# Patient Record
Sex: FEMALE | Race: BLACK OR AFRICAN AMERICAN
[De-identification: names, ages, dates, MRNs, and addresses within clinical notes are randomized per-mention and may not be internally consistent; named-entity substitution may affect disease eponyms.]

---

## 2017-09-02 ENCOUNTER — HOSPITAL ENCOUNTER (EMERGENCY)
Dept: HOSPITAL 74 - JER | Age: 37
Discharge: HOME | End: 2017-09-02
Payer: SELF-PAY

## 2017-09-02 VITALS — SYSTOLIC BLOOD PRESSURE: 129 MMHG | HEART RATE: 87 BPM | TEMPERATURE: 98.5 F | DIASTOLIC BLOOD PRESSURE: 76 MMHG

## 2017-09-02 VITALS — BODY MASS INDEX: 25.8 KG/M2

## 2017-09-02 DIAGNOSIS — R19.7: ICD-10-CM

## 2017-09-02 DIAGNOSIS — K21.9: Primary | ICD-10-CM

## 2017-09-02 LAB
ALBUMIN SERPL-MCNC: 3.8 G/DL (ref 3.4–5)
ALP SERPL-CCNC: 68 U/L (ref 45–117)
ALT SERPL-CCNC: 18 U/L (ref 12–78)
AMYLASE SERPL-CCNC: 125 U/L (ref 25–115)
ANION GAP SERPL CALC-SCNC: 9 MMOL/L (ref 8–16)
APPEARANCE UR: CLEAR
AST SERPL-CCNC: 13 U/L (ref 15–37)
BASOPHILS # BLD: 0.5 % (ref 0–2)
BILIRUB SERPL-MCNC: 0.3 MG/DL (ref 0.2–1)
BILIRUB UR STRIP.AUTO-MCNC: NEGATIVE MG/DL
CALCIUM SERPL-MCNC: 8.8 MG/DL (ref 8.5–10.1)
CO2 SERPL-SCNC: 24 MMOL/L (ref 21–32)
COLOR UR: (no result)
CREAT SERPL-MCNC: 1.5 MG/DL (ref 0.55–1.02)
DEPRECATED RDW RBC AUTO: 16.1 % (ref 11.6–15.6)
EOSINOPHIL # BLD: 3.7 % (ref 0–4.5)
GLUCOSE SERPL-MCNC: 92 MG/DL (ref 74–106)
KETONES UR QL STRIP: NEGATIVE
LEUKOCYTE ESTERASE UR QL STRIP.AUTO: (no result)
MCH RBC QN AUTO: 25.1 PG (ref 25.7–33.7)
MCHC RBC AUTO-ENTMCNC: 31.9 G/DL (ref 32–36)
MCV RBC: 78.6 FL (ref 80–96)
MUCOUS THREADS URNS QL MICRO: (no result)
NEUTROPHILS # BLD: 63.2 % (ref 42.8–82.8)
NITRITE UR QL STRIP: NEGATIVE
PH UR: 5 [PH] (ref 5–8)
PLATELET # BLD AUTO: 296 K/MM3 (ref 134–434)
PMV BLD: 8.3 FL (ref 7.5–11.1)
PROT SERPL-MCNC: 7.9 G/DL (ref 6.4–8.2)
PROT UR QL STRIP: NEGATIVE
PROT UR QL STRIP: NEGATIVE
RBC # BLD AUTO: <1 /HPF (ref 0–3)
RBC # UR STRIP: (no result) /UL
SP GR UR: 1.01 (ref 1–1.02)
UROBILINOGEN UR STRIP-MCNC: NEGATIVE MG/DL (ref 0.2–1)
WBC # BLD AUTO: 6.1 K/MM3 (ref 4–10)
WBC # UR AUTO: 2 /HPF (ref 3–5)

## 2017-09-02 PROCEDURE — 3E033GC INTRODUCTION OF OTHER THERAPEUTIC SUBSTANCE INTO PERIPHERAL VEIN, PERCUTANEOUS APPROACH: ICD-10-PCS

## 2017-09-02 PROCEDURE — 3E0337Z INTRODUCTION OF ELECTROLYTIC AND WATER BALANCE SUBSTANCE INTO PERIPHERAL VEIN, PERCUTANEOUS APPROACH: ICD-10-PCS

## 2017-09-02 NOTE — PDOC
*Physical Exam





- Vital Signs


 Last Vital Signs











Temp Pulse Resp BP Pulse Ox


 


 98.5 F   87   18   129/76   100 


 


 09/02/17 18:29  09/02/17 18:29  09/02/17 18:29  09/02/17 18:29  09/02/17 18:29














ED Treatment Course





- LABORATORY


CBC & Chemistry Diagram: 


 09/02/17 19:22





 09/02/17 19:22





Medical Decision Making





- Medical Decision Making


09/02/17 19:38


35 yo F presenting to the ER s/p return from international travel


(+) abdominal pain and diarrhea





Pt seen by Midlevel Provider under my direct supervision


Pt interviewed and examined


Ancillary studies reviewed





I agree with plan as outlined by Midlevel Provider





09/02/17 19:41








09/02/17 20:31


 Laboratory Tests











  03/15/15 09/02/17 09/02/17





  09:30 19:22 19:22


 


WBC  4.6  6.1  D 


 


Hgb  11.7  9.4 L D 


 


Hct  34.3  29.3 L 


 


Plt Count  209  D  296  D 


 


BUN    14


 


Creatinine    1.5 H


 


Total Amylase    125 H


 


Lipase    448 H














*DC/Admit/Observation/Transfer


Diagnosis at time of Disposition: 


 Diarrhea, GERD (gastroesophageal reflux disease)





- Discharge Dispostion


Disposition: HOME


Condition at time of disposition: Stable





- Prescriptions


Prescriptions: 


Ciprofloxacin HCl [Cipro] 500 mg PO BID #10 tablet





- Referrals


Referrals: 


Silvino Sampson DO [Staff Physician] - 





- Patient Instructions


Printed Discharge Instructions:  Diarrhea, DI for Gastroesophageal Reflux 

Disease (GERD)


Additional Instructions: 


Follow up with the gastroenterologist/Dr. Sampson


Take either:


Pepcid 20mg daily twice a day or


Nexium daily





Increase fluids


Liquid diet x12 hours with slow transition to reg diet





Return to the ER for severe/persistent/worsening symptoms

## 2017-09-02 NOTE — PDOC
History of Present Illness





- General


Chief Complaint: Diarrhea


Stated Complaint: PAIN, ACUTE


Time Seen by Provider: 17 19:12


History Source: Patient


Exam Limitations: No Limitations





- History of Present Illness


Travel History: No


Initial Comments: 


17 22:52


36-year-old female with no medical history presents to the emergency department 

complaining of constant burping/acid taste in her mouth after she burps and 

nonbloody/nonbilious diarrhea 2 days after having dinner at [employer]. Patient states she resides at [facility] came here on vacation. 

Patient denies any fever, chills, nausea/vomiting, chest pain, shortness of 

breath, abdominal pains, urinary symptoms or flank pains. Patient states she 

took one tablet of Imodium 20 minutes prior to her arrival. Patient's boyfriend 

ate at [id number] with her 2 days ago and has similar symptoms/diarrhea which 

subsided 2 hours ago.


Timing/Duration: reports: intermittent





Past History





- Past Medical History


Allergies/Adverse Reactions: 


 Allergies











Allergy/AdvReac Type Severity Reaction Status Date / Time


 


No Known Allergies Allergy   Verified 17 18:31











Home Medications: 


Ambulatory Orders





Ciprofloxacin HCl [Cipro] 500 mg PO BID #10 tablet 17 








Other medical history: denies





- Reproductive History


 (#): 2


Para: 0


Ectopic Pregnancy: Yes (1)


Therapeutic (s) & number: Yes (1)


Spontaneous : 1





- Psycho/Social/Smoking Cessation Hx


Suicidal Ideation: No


Smoking History: Never smoked


Hx Alcohol Use: Yes


Substance Use Type: Alcohol





**Review of Systems





- Review of Systems


Able to Perform ROS?: Yes


Comments:: 


17 23:52


CONSTITUTIONAL: 


Absent: fever, chills, diaphoresis, generalized weakness, malaise, loss of 

appetite


HEENT: 


Absent: rhinorrhea, nasal congestion, throat pain, throat swelling, difficulty 

swallowing, mouth swelling, ear pain, eye pain, visual Changes


CARDIOVASCULAR: 


Absent: chest pain, loss of consciousness, palpitations, irregular heart rate, 

peripheral edema


RESPIRATORY: 


Absent: cough, shortness of breath, dyspnea with exertion, orthopnea, wheezing, 

stridor, hemoptysis


GASTROINTESTINAL:


+diarrhea/ "burping/acid taste in mouth"


Absent: abdominal pain, abdominal distension, nausea, vomiting, constipation, 

melena, hematochezia


GENITOURINARY: 


Absent: dysuria, frequency, urgency, hesitancy, hematuria, flank pain, genital 

pain


MUSCULOSKELETAL: 


Absent: myalgia, arthralgia, joint swelling


SKIN: 


Absent: rash, itching, pallor


HEMATOLOGIC/IMMUNOLOGIC: 


Absent: easy bleeding, easy bruising, lymphadenopathy, frequent infections


ENDOCRINE:


Absent: unexplained weight gain, unexplained weight loss, heat intolerance, 

cold intolerance








Is the patient limited English proficient: No





*Physical Exam





- Vital Signs


 Last Vital Signs











Temp Pulse Resp BP Pulse Ox


 


 98.5 F   87   18   129/76   100 


 


 17 18:29  17 18:29  17 18:29  17 18:29  17 18:29














- Physical Exam


Comments: 


17 23:52


GENERAL:


Well developed, well nourished. Awake and alert. No acute distress.


HEENT:


Normocephalic, atraumatic. PERRLA, EOMI. No conjunctival pallor. Sclera are non-

icteric. Moist mucous membranes. Oropharynx is clear.


NECK: 


Supple. Full ROM. No JVD. Carotid pulses 2+ and symmetric, without bruits. No 

thyromegaly. No lymphadenopathy.


CARDIOVASCULAR:


Regular rate and rhythm. No murmurs, rubs, or gallops. Distal pulses are 2+ and 

symmetric. 


PULMONARY: 


No evidence of respiratory distress. Lungs clear to auscultation bilaterally. 

No wheezing, rales or rhonchi.


ABDOMINAL:


Soft. Non-tender. Non-distended. No rebound or guarding. No organomegaly. 

Normoactive bowel sounds. 


MUSCULOSKELETAL 


Normal range of motion at all joints. No bony deformities or tenderness. No CVA 

tenderness.


EXTREMITIES: 


No cyanosis. No clubbing. No edema. No calf tenderness.


SKIN: 


Warm and dry. Normal capillary refill. No rashes. No jaundice. 


NEUROLOGICAL: 


Alert, awake, appropriate. Cranial nerves 2-12 intact. No deficits to light 

touch and temperature in face, upper extremities and lower extremities. No 

motor deficits in the in face, upper extremities and lower extremities. 

Normoreflexic in the upper and lower extremities. Normal speech. Toes are down-

going bilaterally. Gait is normal without ataxia.


PSYCHIATRIC: 


Cooperative. Good eye contact. Appropriate mood and affect.














ED Treatment Course





- LABORATORY


CBC & Chemistry Diagram: 


 17 19:22





 17 19:22





*DC/Admit/Observation/Transfer


Diagnosis at time of Disposition: 


Diarrhea


Qualifiers:


 Diarrhea type: unspecified type Qualified Code(s): R19.7 - Diarrhea, 

unspecified





GERD (gastroesophageal reflux disease)


Qualifiers:


 Esophagitis presence: without esophagitis Qualified Code(s): K21.9 - Gastro-

esophageal reflux disease without esophagitis





- Discharge Dispostion


Disposition: HOME


Condition at time of disposition: Stable


Admit: No





- Prescriptions


Prescriptions: 


Ciprofloxacin HCl [Cipro] 500 mg PO BID #10 tablet





- Referrals


Referrals: 


Silvino Sampson DO [Staff Physician] - 





- Patient Instructions


Printed Discharge Instructions:  DI for Gastroesophageal Reflux Disease (GERD), 

Diarrhea


Additional Instructions: 


Follow up with the gastroenterologist/Dr. Sampson


Take either:


Pepcid 20mg daily twice a day or


Nexium daily





Increase fluids


Liquid diet x12 hours with slow transition to reg diet





Return to the ER for severe/persistent/worsening symptoms

## 2018-01-07 ENCOUNTER — HOSPITAL ENCOUNTER (EMERGENCY)
Dept: HOSPITAL 74 - JER | Age: 38
LOS: 1 days | Discharge: HOME | End: 2018-01-08
Payer: COMMERCIAL

## 2018-01-07 VITALS — BODY MASS INDEX: 25.7 KG/M2

## 2018-01-07 DIAGNOSIS — N94.6: ICD-10-CM

## 2018-01-07 DIAGNOSIS — N92.0: Primary | ICD-10-CM

## 2018-01-08 VITALS — SYSTOLIC BLOOD PRESSURE: 138 MMHG | TEMPERATURE: 98.6 F | HEART RATE: 84 BPM | DIASTOLIC BLOOD PRESSURE: 93 MMHG

## 2018-01-08 LAB
BASOPHILS # BLD: 1.4 % (ref 0–2)
DEPRECATED RDW RBC AUTO: 14.4 % (ref 11.6–15.6)
EOSINOPHIL # BLD: 3.5 % (ref 0–4.5)
HCT VFR BLD CALC: 30.7 % (ref 32.4–45.2)
HGB BLD-MCNC: 9.9 GM/DL (ref 10.7–15.3)
LYMPHOCYTES # BLD: 38.1 % (ref 8–40)
MCH RBC QN AUTO: 29.5 PG (ref 25.7–33.7)
MCHC RBC AUTO-ENTMCNC: 32.4 G/DL (ref 32–36)
MCV RBC: 91 FL (ref 80–96)
MONOCYTES # BLD AUTO: 4.9 % (ref 3.8–10.2)
NEUTROPHILS # BLD: 52.1 % (ref 42.8–82.8)
PLATELET # BLD AUTO: 240 K/MM3 (ref 134–434)
PMV BLD: 8.4 FL (ref 7.5–11.1)
RBC # BLD AUTO: 3.37 M/MM3 (ref 3.6–5.2)
WBC # BLD AUTO: 5.9 K/MM3 (ref 4–10)

## 2018-01-08 NOTE — PDOC
History of Present Illness





- History of Present Illness


Initial Comments: 





18 01:36


The patient is a 37 year old female, with a significant past medical history of 

DNC (2016), heavy menstrual periods, who presents to the emergency department 

with vaginal bleeding for 10 days. Patient is visiting from [place]. She 

states that she began experiencing cramping and heavy bleeding for 10 days. She 

states she has a DNC in 2016 for heavy bleeding done elsewhere. Her LMP before 

today was 17 which also lasted abnormally long. She states she was 

hospitalized for 6 days in [place] and became anemic and received a blood 

transfusion. She states she has been taking Midol for the pain. She states she 

has gone through 10 pads today. 





She denies recent fevers, chills, headache or dizziness. She denies recent 

nausea, vomit, diarrhea or constipation. She denies recent  dysuria, frequency, 

urgency or hematuria. She denies recent chest pain or shortness of breath.





Allergies: NKA 








<Sara Cody - Last Filed: 18 01:36>





<Shannan Miller - Last Filed: 18 02:23>





- General


Chief Complaint: Vaginal Bleeding


Stated Complaint: VAGINAL BLEEDING


Time Seen by Provider: 18 00:31





Past History





<Sara Cody - Last Filed: 18 01:36>





- Reproductive History


 (#): 2


Para: 0


Ectopic Pregnancy: Yes (1)


Therapeutic (s) & number: Yes (1)


Spontaneous : 1





- Suicide/Smoking/Psychosocial Hx


Smoking History: Never smoked


Have you smoked in the past 12 months: No


Information on smoking cessation initiated: No


Hx Alcohol Use: No


Drug/Substance Use Hx: No


Substance Use Type: Alcohol





<Shannan Miller - Last Filed: 18 02:23>





- Past Medical History


Allergies/Adverse Reactions: 


 Allergies











Allergy/AdvReac Type Severity Reaction Status Date / Time


 


No Known Allergies Allergy   Verified 17 18:31











Home Medications: 


Ambulatory Orders





Ciprofloxacin HCl [Cipro] 500 mg PO BID #10 tablet 17 











**Review of Systems





- Review of Systems


Comments:: 





18 01:36


CONSTITUTIONAL:


Absent: fever, no chills, no fatigue


EYES:


Absent: visual changes


ENT:


Absent: ear pain, no sore throat


CARDIOVASCULAR:


Absent: chest pain, no palpitations


RESPIRATORY:


Absent: cough, no SOB


GI:


Absent: abdominal pain, no nausea, no vomiting, no constipation, no diarrhea


GENITOURINARY:


Absent: dysuria, no frequency, no hematuria


GYN: 


Present: vaginal bleeding and cramping


MUSCULOSKELETAL:


Absent: back pain, no arthralgia, no myalgia


SKIN:


Absent: rash


NEURO:


Absent: headache








<Sara Cody - Last Filed: 18 01:36>





*Physical Exam





- Vital Signs


 Last Vital Signs











Temp Pulse Resp BP Pulse Ox


 


 98.6 F   84   20   138/93   98 


 


 18 00:02  18 00:02  18 00:02  18 00:02  18 00:02














- Physical Exam


Comments: 





18 01:38


GENERAL: 


Well-appearing, well-nourished. No apparent distress.


HEENT: 


Normocephalic, atraumatic. PERRL, EOM intact.


CARDIOVASCULAR: 


Normal S1, S2. Regular rate and rhythm.


PULMONARY: 


Clear to auscultation bilaterally.


ABDOMEN: 


Soft, non-distended, non-tender. 


EXTREMITIES: 


Normal ROM in all four extremities. No gross deformities.


SKIN: 


Warm, dry.  No rash


NEUROLOGICAL: 


No focal neurological deficits.











<Sara Cody - Last Filed: 18 01:36>





- Vital Signs


 Last Vital Signs











Temp Pulse Resp BP Pulse Ox


 


 98.6 F   84   20   138/93   98 


 


 18 00:02  18 00:02  18 00:02  18 00:02  18 00:02














<Shannan Miller - Last Filed: 18 02:23>





ED Treatment Course





- LABORATORY


CBC & Chemistry Diagram: 


 18 01:32








<Sara Cody - Last Filed: 18 01:36>





- LABORATORY


CBC & Chemistry Diagram: 


 18 01:32








<Shannan Miller - Last Filed: 18 02:23>





*DC/Admit/Observation/Transfer





- Attestations


Scribe Attestion: 





18 01:39





Documentation prepared by Sara Cody, acting as medical scribe for Shannan Miller MD.





<Sara Cody - Last Filed: 18 01:36>





<Shannan Miller - Last Filed: 18 02:23>


Diagnosis at time of Disposition: 


 Menses painful








- Discharge Dispostion


Disposition: HOME


Condition at time of disposition: Stable





- Referrals


Referrals: 


Tee Craft MD [Staff Physician] - 


Angel Kline MD [Staff Physician] - 


Ana Lowery DO [Staff Physician] - 





- Patient Instructions


Printed Discharge Instructions:  DI for Menorrhagia


Additional Instructions: 


please take motrin for cramping pain


Please followup with gynecologist

## 2018-01-09 ENCOUNTER — HOSPITAL ENCOUNTER (OUTPATIENT)
Dept: HOSPITAL 74 - JER | Age: 38
Discharge: HOME | End: 2018-01-09
Attending: OBSTETRICS & GYNECOLOGY
Payer: COMMERCIAL

## 2018-01-09 VITALS — BODY MASS INDEX: 26.8 KG/M2

## 2018-01-09 VITALS — TEMPERATURE: 98.5 F

## 2018-01-09 VITALS — SYSTOLIC BLOOD PRESSURE: 130 MMHG | DIASTOLIC BLOOD PRESSURE: 79 MMHG | HEART RATE: 80 BPM

## 2018-01-09 DIAGNOSIS — D25.0: ICD-10-CM

## 2018-01-09 DIAGNOSIS — D64.9: ICD-10-CM

## 2018-01-09 DIAGNOSIS — N92.1: Primary | ICD-10-CM

## 2018-01-09 LAB
ALBUMIN SERPL-MCNC: 3.5 G/DL (ref 3.4–5)
ALP SERPL-CCNC: 56 U/L (ref 45–117)
ALT SERPL-CCNC: 16 U/L (ref 12–78)
ANION GAP SERPL CALC-SCNC: 11 MMOL/L (ref 8–16)
APPEARANCE UR: (no result)
AST SERPL-CCNC: 15 U/L (ref 15–37)
BACTERIA #/AREA URNS HPF: (no result) /HPF
BASOPHILS # BLD: 1 % (ref 0–2)
BILIRUB SERPL-MCNC: 0.2 MG/DL (ref 0.2–1)
BILIRUB UR STRIP.AUTO-MCNC: NEGATIVE MG/DL
BUN SERPL-MCNC: 15 MG/DL (ref 7–18)
CALCIUM SERPL-MCNC: 8.7 MG/DL (ref 8.5–10.1)
CHLORIDE SERPL-SCNC: 113 MMOL/L (ref 98–107)
CO2 SERPL-SCNC: 19 MMOL/L (ref 21–32)
COLOR UR: YELLOW
CREAT SERPL-MCNC: 1.1 MG/DL (ref 0.55–1.02)
DEPRECATED RDW RBC AUTO: 14.6 % (ref 11.6–15.6)
EOSINOPHIL # BLD: 2.3 % (ref 0–4.5)
EPITH CASTS URNS QL MICRO: (no result) /HPF
GLUCOSE SERPL-MCNC: 86 MG/DL (ref 74–106)
HCT VFR BLD CALC: 26.2 % (ref 32.4–45.2)
HGB BLD-MCNC: 8.3 GM/DL (ref 10.7–15.3)
INR BLD: 1.07 (ref 0.82–1.09)
KETONES UR QL STRIP: (no result)
LEUKOCYTE ESTERASE UR QL STRIP.AUTO: (no result)
LYMPHOCYTES # BLD: 25.8 % (ref 8–40)
MCH RBC QN AUTO: 29.2 PG (ref 25.7–33.7)
MCHC RBC AUTO-ENTMCNC: 31.8 G/DL (ref 32–36)
MCV RBC: 91.8 FL (ref 80–96)
MONOCYTES # BLD AUTO: 3.8 % (ref 3.8–10.2)
MUCOUS THREADS URNS QL MICRO: (no result)
NEUTROPHILS # BLD: 67.1 % (ref 42.8–82.8)
NITRITE UR QL STRIP: POSITIVE
PH UR: 5 [PH] (ref 5–8)
PLATELET # BLD AUTO: 233 K/MM3 (ref 134–434)
PMV BLD: 8.5 FL (ref 7.5–11.1)
POTASSIUM SERPLBLD-SCNC: 4.3 MMOL/L (ref 3.5–5.1)
PROT SERPL-MCNC: 7 G/DL (ref 6.4–8.2)
PROT UR QL STRIP: (no result)
PROT UR QL STRIP: NEGATIVE
PT PNL PPP: 12.1 SEC (ref 9.98–11.88)
RBC # BLD AUTO: 2.85 M/MM3 (ref 3.6–5.2)
RBC # UR STRIP: (no result) /UL
SODIUM SERPL-SCNC: 143 MMOL/L (ref 136–145)
SP GR UR: 1.02 (ref 1–1.03)
UROBILINOGEN UR STRIP-MCNC: NEGATIVE MG/DL (ref 0.2–1)
WBC # BLD AUTO: 6.9 K/MM3 (ref 4–10)

## 2018-01-09 PROCEDURE — 0UDB8ZX EXTRACTION OF ENDOMETRIUM, VIA NATURAL OR ARTIFICIAL OPENING ENDOSCOPIC, DIAGNOSTIC: ICD-10-PCS | Performed by: OBSTETRICS & GYNECOLOGY

## 2018-01-09 NOTE — EKG
Test Reason : 

Blood Pressure : ***/*** mmHG

Vent. Rate : 074 BPM     Atrial Rate : 074 BPM

   P-R Int : 172 ms          QRS Dur : 070 ms

    QT Int : 382 ms       P-R-T Axes : 049 027 019 degrees

   QTc Int : 424 ms

 

NORMAL SINUS RHYTHM

NORMAL ECG

NO PREVIOUS ECGS AVAILABLE

Confirmed by Joel Mitchell MD ([id number]) on 1/9/2018 2:38:03 PM

 

Referred By:             Confirmed By:Joel Mitchell MD

## 2018-01-09 NOTE — PDOC
History of Present Illness





<Rashid Sanabria - Last Filed: 18 16:19>





- General


History Source: Patient


Exam Limitations: No Limitations





- History of Present Illness


Initial Comments: 





18 17:55


Patient is a 37 year old female with a significant past medical history of DNC (

2016), Fibroids, heavy menstrual periods who presents to the ED from OB office 

with complaints of heavy vaginal bleeding. Patient reports experiencing heavy 

vaginal bleeding for the last 10 days.  She reports coming into the [facility] ED 

yesterday for the same complaint and was discharged to follow up with GYN.  

Patient was advised by Dr. Fernandez to come into the ED for admission to the 

OR for heavy vaginal bleeding.  Patient currently has no other complaints while 

in the ED. 





Denies Chest pain, SOB. Denies nausea, vomiting. Denies fevers, chills. Denies 

contact with sick individuals, out of state travelling.  Denies any other 

symptoms. 





Allergies: None 


Social history: No smoking. No alcohol. No illicit drugs. 


Surgical history: None


PMD: None 





<George Lynch - Last Filed: 18 17:56>





- General


Chief Complaint: Vaginal Bleeding


Stated Complaint: VAGINAL BLEEDING


Time Seen by Provider: 18 13:22





Past History





- Past Medical History


COPD: No





- Reproductive History


 (#): 2


Para: 0


Ectopic Pregnancy: Yes (1)


Therapeutic (s) & number: Yes (1)


Spontaneous : 1





- Suicide/Smoking/Psychosocial Hx


Smoking History: Never smoked


Have you smoked in the past 12 months: No


Information on smoking cessation initiated: No


Hx Alcohol Use: No


Drug/Substance Use Hx: No


Substance Use Type: Alcohol





<Rashid Sanabria - Last Filed: 18 16:19>





<George Lynch - Last Filed: 18 17:56>





- Past Medical History


Allergies/Adverse Reactions: 


 Allergies











Allergy/AdvReac Type Severity Reaction Status Date / Time


 


No Known Allergies Allergy   Verified 18 13:18











Home Medications: 


Ambulatory Orders





NK [No Known Home Medication]  18 











**Review of Systems





- Review of Systems


Able to Perform ROS?: Yes


Comments:: 





18 17:55


GENERAL/CONSTITUTIONAL: No fever or chills. No weakness.


HEAD, EYES, EARS, NOSE AND THROAT: No change in vision. No ear pain or 

discharge. No sore throat.


GASTROINTESTINAL: No nausea, vomiting, diarrhea or constipation.


GENITOURINARY: +Vagiinal bleeding


No dysuria, frequency, or change in urination.


CARDIOVASCULAR: No chest pain or shortness of breath.


RESPIRATORY: No cough, wheezing, or hemoptysis.


MUSCULOSKELETAL: No joint or muscle swelling or pain. No neck or back pain.


SKIN: No rash


NEUROLOGIC: No headache, vertigo, loss of consciousness, or change in strength/

sensation.


ENDOCRINE: No increased thirst. No abnormal weight change.


HEMATOLOGIC/LYMPHATIC: No anemia, easy bleeding, or history of blood clots.


ALLERGIC/IMMUNOLOGIC: No hives or skin allergy. 


All Other Systems: Reviewed and Negative





<George Lynch - Last Filed: 18 17:56>





*Physical Exam





- Vital Signs


 Last Vital Signs











Temp Pulse Resp BP Pulse Ox


 


 99.8 F H  96 H  18   157/81   100 


 


 18 13:15  18 13:15  18 13:15  18 13:15  18 13:15














<Rashid Sanabria - Last Filed: 18 16:19>





- Vital Signs


 Last Vital Signs











Temp Pulse Resp BP Pulse Ox


 


 98.4 F   69   16   113/69   98 


 


 18 16:16  18 16:16  18 16:16  18 16:16  18 16:16














- Physical Exam


Comments: 





18 17:55


GENERAL: Awake, alert, and fully oriented, in no acute distress


HEAD: No signs of trauma


EYES: PERRLA, EOMI, sclera anicteric, conjunctiva clear


ENT: Auricles normal inspection, hearing grossly normal, nares patent, 

oropharynx clear without exudates. Moist mucosa


NECK: Normal ROM, supple, no lymphadenopathy, JVD, or masses


LUNGS: Breath sounds equal, clear to auscultation bilaterally. No wheezes, and 

no crackles


HEART: Regular rate and rhythm, normal S1 and S2, no murmurs, rubs or gallops


ABDOMEN: Soft, nontender, normoactive bowel sounds. No guarding, no rebound. No 

masses


EXTREMITIES: Normal range of motion, no edema. No clubbing or cyanosis. No cords

, erythema, or tenderness


GYN: no active vaginal bleeding, deferred bimanual/speculum exam as pt is going 

to OR


NEUROLOGICAL: Normal speech, cranial nerves intact, negative pronator drift, 5/

5 strength in all 4 extremities, normal sensation to light touch in all 4 

extremities, normal cerebellar exam, normal gait, normal reflexes and tone


SKIN: Warm, Dry, normal turgor, no rashes or lesions noted.





<George Lynch - Last Filed: 18 17:56>





ED Treatment Course





- LABORATORY


CBC & Chemistry Diagram: 


 18 13:55





 18 13:55





<Rashid Sanabria - Last Filed: 18 16:19>





- LABORATORY


CBC & Chemistry Diagram: 


 18 13:55





 18 13:55





- ADDITIONAL ORDERS


Additional order review: 


 Laboratory  Results











  18





  13:55 13:55 13:55


 


PTT (Actin FS)    25.8 L


 


Sodium   143 


 


Potassium   4.3 


 


Chloride   113 H 


 


Carbon Dioxide   19 L D 


 


Anion Gap   11 


 


BUN   15 


 


Creatinine   1.1 H D 


 


Creat Clearance w eGFR   55.30 


 


Random Glucose   86 


 


Calcium   8.7 


 


Total Bilirubin   0.2  D 


 


AST   15 


 


ALT   16 


 


Alkaline Phosphatase   56 


 


Total Protein   7.0 


 


Albumin   3.5 


 


Beta HCG, Quant   < 1.0 


 


Urine Color  Yellow  


 


Urine Appearance  Cloudy  


 


Urine pH  5.0  


 


Ur Specific Gravity  1.016  


 


Urine Protein  1+ H  


 


Urine Glucose (UA)  Negative  


 


Urine Ketones  Trace H  


 


Urine Blood  3+ H  


 


Urine Nitrite  Positive  


 


Urine Bilirubin  Negative  


 


Urine Urobilinogen  Negative  


 


Ur Leukocyte Esterase  3+ H  


 


Urine WBC (Auto)  358  


 


Urine RBC (Auto)  753  


 


Ur Epithelial Cells  Rare  


 


Urine Bacteria  Many  


 


Urine Mucus  Rare  


 


Urine HCG, Qual   


 


Blood Type   














  18





  13:55 13:34


 


PTT (Actin FS)  


 


Sodium  


 


Potassium  


 


Chloride  


 


Carbon Dioxide  


 


Anion Gap  


 


BUN  


 


Creatinine  


 


Creat Clearance w eGFR  


 


Random Glucose  


 


Calcium  


 


Total Bilirubin  


 


AST  


 


ALT  


 


Alkaline Phosphatase  


 


Total Protein  


 


Albumin  


 


Beta HCG, Quant  


 


Urine Color  


 


Urine Appearance  


 


Urine pH  


 


Ur Specific Gravity  


 


Urine Protein  


 


Urine Glucose (UA)  


 


Urine Ketones  


 


Urine Blood  


 


Urine Nitrite  


 


Urine Bilirubin  


 


Urine Urobilinogen  


 


Ur Leukocyte Esterase  


 


Urine WBC (Auto)  


 


Urine RBC (Auto)  


 


Ur Epithelial Cells  


 


Urine Bacteria  


 


Urine Mucus  


 


Urine HCG, Qual  Negative 


 


Blood Type   O POSITIVE








 











  18





  13:55


 


RBC  2.85 L


 


MCV  91.8


 


MCHC  31.8 L


 


RDW  14.6


 


MPV  8.5


 


Neutrophils %  67.1  D


 


Lymphocytes %  25.8  D


 


Monocytes %  3.8


 


Eosinophils %  2.3


 


Basophils %  1.0














- Medications


Given in the ED: 


ED Medications














Discontinued Medications














Generic Name Dose Route Start Last Admin





  Trade Name Jacki  PRN Reason Stop Dose Admin


 


Sodium Chloride  1,000 ml  18 13:38  18 14:08





  Normal Saline -  IV  18 13:39  1,000 ml





  ONCE ONE   Administration














<George Lynch - Last Filed: 18 17:56>





Medical Decision Making





- Medical Decision Making





18 14:19


37-year-old female with a history of fibroids and DNC presents with heavy 

vaginal bleeding and is sent in for admission for dilation and curettage by OB. 

Will obtain preop labs and an ultrasound and admit the patient to Dr. Fernandez





<Rashid Sanabria - Last Filed: 18 16:19>





*DC/Admit/Observation/Transfer





- Discharge Dispostion


Admit: Yes





- Attestations


Physician Attestion: 





18 16:21








I, Dr. aRshid Sanabria MD, attest that this document has been prepared under my 

direction and personally reviewed by me in its entirety.   I further attest, 

that it accurately reflects all work, treatment, procedures and medical decision

-making performed by me.  





<Rashid Sanabria - Last Filed: 18 16:19>





- Attestations


Scribe Attestion: 





18 17:56





Documentation prepared by George Lynch, acting as medical scribe for Rashid Sanabria MD, MD/DO.





<George Lynch - Last Filed: 18 17:56>


Diagnosis at time of Disposition: 


 Menorrhagia, Fibroid








- Discharge Dispostion


Condition at time of disposition: Stable

## 2018-01-09 NOTE — HP
Past Medical History





- Primary Care Physician


PCP:: Abigail Fernandez





- Admission


Chief Complaint: 37 yrs , 3ctopic 1 , c/o heavy vaginal bleeding 

accompanied by pain for 9-12days , admitted for emergency D&C


History of Present Illness: 


pt us admitted from ER today songram was done today in ER reported as Leiomyoma 

uterus, multiple fibroid, fundal fibriid 1x2 cm, posterior fibroid in Lower 

uterine segment 3x2x5 cm approx with submucous extension, another post fibroid 

about 4 cm .


patient also was seen in the Clinic in AM , bleeding profusely & c/o pain scale 

10/10 


she was in ER on 18 pregn test neg, h/9.9/30.7, plt 240 .


pt states [place] country [place] she had heavy bleeding, she was 

severely anemic , she receieved 2 units of pack cells .


she was told she has fibroids 


she has been bleeding heavy for past 4-5 months during period x10-12 days 

bleeding , changes 11 pads per day .


before 4 months  28/30 day cycle, bleeding foe 4-5 days , changed 3-4 pads /

day .


 No h/o contraception 


Last Pap in [facility] , in  normal as per pt . 


no h/o abn pap


she denies h/o STD 


contraception none 








History Source: Patient





- Past Medical History


CNS: Yes: Syncope.  No: Migraine, Seizure


Cardiovascular: Yes: HTN (h/o labile htn, she was [place] meds in [place] country, did 

not take the meds all the time . presently no meds).  No: Murmur


Pulmonary: No: Asthma


Gastrointestinal: No: Constipation, GERD, GI Bleed


Hepatobiliary: No: Cirrhosis


Renal/: No: UTI


Reproductive: Yes: Ectopic Pregnancy (, Left salpingectomy in her country 

[facility]).  No: PID


...: 2


...Para: 0


...Spon : 1 (2016 1st trimester d&C done )


...LMP: 17


Heme/Onc: Yes: Anemia (h/o pack cell transfusion in  in [facility])


Infectious Disease: Yes: Other (declines)


Psych: Yes: Other (declines)


Musculoskeletal: Yes: Other (declines)


Rheumatology: Yes: Other (declines)


Endocrine: Yes: Other (declines)


Dermatology: Yes: Other (none)





- Past Surgical History


Hx Myomectomy: No


Hx Transabdominal Cerclage: No


Additional Surgical History: 2005 EXPl Lap , Left salpingectomy for ectopic 

pregn in [facility].  2016  D&C for sp ab in [facility]





- Smoking History


Smoking history: Never smoked


Have you smoked in the past 12 months: No





- Alcohol/Substance Use


Hx Alcohol Use: No


History of Substance Use: reports: None





Home Medications





- Allergies


Allergies/Adverse Reactions: 


 Allergies











Allergy/AdvReac Type Severity Reaction Status Date / Time


 


No Known Allergies Allergy   Verified 18 13:18














- Home Medications


Home Medications: 


Ambulatory Orders





NK [No Known Home Medication]  18 








Home Medications (free text): iron pills from her country





Family Disease History





- Family Disease History


Family Disease History: CA: Mother ( of ovarian cancer )





Physical Exam-GYN


Vital Signs: 


 Vital Signs











Temperature  98.4 F   18 16:16


 


Pulse Rate  69   18 16:16


 


Respiratory Rate  16   18 16:16


 


Blood Pressure  113/69   18 16:16


 


O2 Sat by Pulse Oximetry (%)  98   18 16:16











Constitutional: Yes: Severe Distress, Pallor


Eyes: Yes: WNL


HENT: Yes: WNL


Neck: Yes: WNL


Cardiovascular: Yes: WNL


Respiratory: Yes: WNL


Gastrointestinal: Yes: WNL


Renal/: Yes: WNL


Pelvis: Yes: WNL


External Genitalia: Yes: Normal


Internal Exam Deferred: Yes


Vaginal Exam: Yes: Bleeding (profuse with clots)


Cervix: Yes: Bleeding.  No: Polyp


Uterus: Yes: Freely Moveable, Anteverted, Enlarged (irregular shape 8-10 weeks 

size), Firm.  No: Tender


Adnexa: Normal: Bilateral, Not Palpable: Bilateral


Breast(s): Yes: WNL


Musculoskeletal: Yes: WNL


Extremities: Yes: WNL.  No: Calf Tenderness


Edema: No


Integumentary: Yes: Incision (subumblical midline scar of ectopic pregn)


...Motor Strength: WNL


Psychiatric: Yes: WNL, Alert


Labs: 


 CBC, BMP





 18 13:55 





 18 13:55 





 Laboratory Tests











  18





  13:34


 


Blood Type  O POSITIVE


 


Antibody Screen  Pending














Imaging





- Results


Ultrasound: Report Reviewed


EKG: Report Reviewed





Problem List





- Problem


(1) Menometrorrhagia


Code(s): N92.1 - EXCESSIVE AND FREQUENT MENSTRUATION WITH IRREGULAR CYCLE   





(2) Fibroid uterus


Code(s): D25.9 - LEIOMYOMA OF UTERUS, UNSPECIFIED   


Qualifiers: 


   Uterine leiomyoma location: intramural and submucous   Qualified Code(s): 

D25.1 - Intramural leiomyoma of uterus; D25.0 - Submucous leiomyoma of uterus; 

D25.0 - Submucous leiomyoma of uterus   





(3) Anemia


Code(s): D64.9 - ANEMIA, UNSPECIFIED   


Qualifiers: 


   Iron deficiency anemia type: chronic blood loss 





Assessment/Plan


37 yrs  ectopic pregn x1 , menometrorrhagia , fibroid uterus, intramural 

& submucous fibroid confirmed with sono , severe anemia


Plan : D&C Hysteroscopy

## 2018-01-09 NOTE — OP
Operative Note





- Note:


Operative Date: 01/09/18


Pre-Operative Diagnosis: menometrorrhagia , fibroid uterus


Operation: Hysteroscopy, D&C


Findings: 





ut  av irregular 12 weeks size 


uterocervical length 9 cm 


endocervical fibroid 


& ower segment posteriorly obstruction encountered, no distict margins fibroid 

could be evaluated


body of uterus fleshy em seen 


both ostia visualised 


Post-Operative Diagnosis: Other (submucous fibroid)


Surgeon: Abigail Fernandez


Anesthesiologist/CRNA: Gabino Mcneill


Anesthesia: General


Specimens Removed: ecc.  emc


Estimated Blood Loss (mls): 50


Fluid Volume Replaced (mls): 400 (iv ancef 1 gm given )


Operative Report Dictated: Yes

## 2018-01-10 NOTE — OP
DATE OF OPERATION:  2018

 

PREOPERATIVE DIAGNOSES:  Menometrorrhagia, fibroid uterus, severe anemia.

 

OPERATION:  Hysteroscopy, dilatation and curettage.

 

POSTOPERATIVE DIAGNOSES:  Menometrorrhagia, fibroid uterus, severe anemia, and a

submucous fibroid.

 

FINDINGS:  This is a 37-year-old,  2, para 0-0-1-0, ectopic.  She has a

history of LMP on 2017, is here for vaginal bleeding continuously 
for 12

days, at times very heavy and hemoglobin today of 8.3.  Sonogram shows multiple

fibroids, posterior fibroid 5 x 4 cm, intramural, encroaching the endometrial 
cavity

and another posterior fibroid, another fundal fibroid.

 

DESCRIPTION OF PROCEDURE:  Patient is taken to the operating room table, and 
general

anesthesia was given.  Lithotomy position was given. pubis, perineum, vagina 
were

painted with Betadine, draped in usual manner.  Pelvic examination was done.  
Uterus

was anteverted, 12 weeks' size, irregular, mobile, and then, adnexa were not

palpable.  Weighted speculum was put.  Anterior lip of the cervix was held with 
a

single-tooth tenaculum, and cervix was dilated with a 5-mm dilator.  Then, 
diagnostic

hysteroscope was introduced.  There was an endocervical  posterior.  Small

fibroid was noted, and then hysteroscope was advanced ..  In the posterior lower

segment, obstruction was encountered but no defining limitations could be seen 
of the

fibroid, and then, the body of the uterus and both ostia were visualized. The 
fleshy 

endometrium was noted.  Hysteroscope was removed, and an ECC was done.  Then,

curettage of the endometrial cavity was done.  Patient tolerated procedure well
, and

estimated blood loss was 50 mL.  She received IV Ancef 1 g prior to starting the

procedure, and she was transferred to the recovery room in stable condition.

 

 

JERRY [id number]

DD: 2018 18:55

DT: 01/10/2018 09:49

Job #:  [id number]

MTDD

## 2018-01-11 NOTE — PATH
Surgical Pathology Report



Patient Name:  MARY BERKOWITZ

Accession #:  

[facility]. Rec. #:  [id number]                                                        

   /Age/Gender:  1980 (Age: 37) / F

Account:  [id number]                                                          

             Location: AMBULATORY SURG

Taken:  2018

Received:  1/10/2018

Reported:  2018

Physicians:  Abigail Fernandez M.D.

  



Specimen(s) Received

A: ENDOCERVICAL CURETTINGS 

B: ENDOCERVICAL CURETTINGS 





Clinical History

Preoperative diagnosis: Vaginal bleeding







Final Diagnosis

A. ENDOCERVIX, CURETTING:

BENIGN ENDOCERVICAL TISSUE WITH ACUTE INFLAMMATION.



B. ENDOMETRIUM, CURETTING:

ENDOMETRIUM WITH STROMAL AND GLANDULAR BREAKDOWN, AND AREAS SUGGESTIVE OF BENIGN

ENDOMETRIAL POLYP.

NO ENDOMETRIAL HYPERPLASIA OR CARCINOMA IDENTIFIED. 







***Electronically Signed***

Saul Elena M.D.





Gross Description

A. Received in formalin labeled "endocervical curettings," is a 1.0 x 0.5 x 0.2

cm aggregate of tan-brown soft tissue fragments. The formalin is filtered and

the specimen is entirely submitted in one cassette.



B. Received in formalin labeled "endometrial curettings," is a 4.0 x 3.5 x 0.3

cm aggregate of tan-brown soft tissue fragments admixed with blood clot. The

formalin is filtered and the specimen is entirely submitted in 3 cassettes.

/1/10/2018



saudi/1/10/2018